# Patient Record
Sex: MALE | Race: BLACK OR AFRICAN AMERICAN | NOT HISPANIC OR LATINO
[De-identification: names, ages, dates, MRNs, and addresses within clinical notes are randomized per-mention and may not be internally consistent; named-entity substitution may affect disease eponyms.]

---

## 2017-01-18 PROBLEM — Z00.00 ENCOUNTER FOR PREVENTIVE HEALTH EXAMINATION: Status: ACTIVE | Noted: 2017-01-18

## 2017-01-20 ENCOUNTER — APPOINTMENT (OUTPATIENT)
Dept: ORTHOPEDIC SURGERY | Facility: CLINIC | Age: 37
End: 2017-01-20

## 2017-01-20 VITALS — BODY MASS INDEX: 22.75 KG/M2 | WEIGHT: 168 LBS | HEIGHT: 72 IN

## 2017-01-20 DIAGNOSIS — M25.512 PAIN IN LEFT SHOULDER: ICD-10-CM

## 2017-01-20 DIAGNOSIS — M19.90 UNSPECIFIED OSTEOARTHRITIS, UNSPECIFIED SITE: ICD-10-CM

## 2017-01-20 DIAGNOSIS — G89.29 PAIN IN LEFT SHOULDER: ICD-10-CM

## 2017-01-20 DIAGNOSIS — F17.200 NICOTINE DEPENDENCE, UNSPECIFIED, UNCOMPLICATED: ICD-10-CM

## 2017-01-24 ENCOUNTER — APPOINTMENT (OUTPATIENT)
Dept: MRI IMAGING | Facility: CLINIC | Age: 37
End: 2017-01-24

## 2017-01-24 ENCOUNTER — OUTPATIENT (OUTPATIENT)
Dept: OUTPATIENT SERVICES | Facility: HOSPITAL | Age: 37
LOS: 1 days | End: 2017-01-24
Payer: MEDICAID

## 2017-01-24 DIAGNOSIS — M25.512 PAIN IN LEFT SHOULDER: ICD-10-CM

## 2017-01-24 PROCEDURE — 73221 MRI JOINT UPR EXTREM W/O DYE: CPT

## 2017-02-02 ENCOUNTER — APPOINTMENT (OUTPATIENT)
Dept: ORTHOPEDIC SURGERY | Facility: CLINIC | Age: 37
End: 2017-02-02

## 2017-02-02 DIAGNOSIS — S43.432A SUPERIOR GLENOID LABRUM LESION OF LEFT SHOULDER, INITIAL ENCOUNTER: ICD-10-CM

## 2017-10-16 ENCOUNTER — APPOINTMENT (OUTPATIENT)
Dept: ORTHOPEDIC SURGERY | Facility: HOSPITAL | Age: 37
End: 2017-10-16

## 2017-11-01 ENCOUNTER — APPOINTMENT (OUTPATIENT)
Dept: ORTHOPEDIC SURGERY | Facility: CLINIC | Age: 37
End: 2017-11-01

## 2019-06-26 ENCOUNTER — APPOINTMENT (OUTPATIENT)
Dept: UROLOGY | Facility: CLINIC | Age: 39
End: 2019-06-26
Payer: MEDICAID

## 2019-06-26 VITALS
WEIGHT: 180 LBS | TEMPERATURE: 98.4 F | HEART RATE: 77 BPM | HEIGHT: 72 IN | BODY MASS INDEX: 24.38 KG/M2 | RESPIRATION RATE: 13 BRPM | SYSTOLIC BLOOD PRESSURE: 120 MMHG | DIASTOLIC BLOOD PRESSURE: 80 MMHG | OXYGEN SATURATION: 98 %

## 2019-06-26 DIAGNOSIS — N50.3 CYST OF EPIDIDYMIS: ICD-10-CM

## 2019-06-26 PROCEDURE — 99204 OFFICE O/P NEW MOD 45 MIN: CPT

## 2019-06-26 NOTE — ASSESSMENT
[FreeTextEntry1] : patient with firm mass felt sup to left testes\par exam signif for ? epididymal cyst\par will get sono for reassurance\par gilson lnotify of results \par labs from PCP to be faxed over for review\par patient understands above diagnosis and evaluation and all questions answered.\par

## 2019-06-26 NOTE — PHYSICAL EXAM
[General Appearance - Well Developed] : well developed [Normal Appearance] : normal appearance [General Appearance - Well Nourished] : well nourished [Well Groomed] : well groomed [General Appearance - In No Acute Distress] : no acute distress [Edema] : no peripheral edema [Abdomen Soft] : soft [Respiration, Rhythm And Depth] : normal respiratory rhythm and effort [Exaggerated Use Of Accessory Muscles For Inspiration] : no accessory muscle use [Abdomen Tenderness] : non-tender [] : no hepato-splenomegaly [Abdomen Hernia] : no hernia was discovered [Abdomen Mass (___ Cm)] : no abdominal mass palpated [Costovertebral Angle Tenderness] : no ~M costovertebral angle tenderness [Urethral Meatus] : meatus normal [Penis Abnormality] : normal circumcised penis [Scrotum] : the scrotum was normal [Epididymis] : the epididymides were normal [Testes Tenderness] : no tenderness of the testes [Testes Mass (___cm)] : there were no testicular masses [FreeTextEntry1] : small firm 5mm lesion at head of left epidiymis. ? smaller soft lesion at tail and right head , not tender [No Focal Deficits] : no focal deficits [Normal Station and Gait] : the gait and station were normal for the patient's age [Oriented To Time, Place, And Person] : oriented to person, place, and time [Affect] : the affect was normal [Not Anxious] : not anxious [Mood] : the mood was normal [Cervical Lymph Nodes Enlarged Anterior Bilaterally] : anterior cervical [Supraclavicular Lymph Nodes Enlarged Bilaterally] : supraclavicular [Cervical Lymph Nodes Enlarged Posterior Bilaterally] : posterior cervical

## 2019-06-26 NOTE — REASON FOR VISIT
Sheryle Coupe is a 60year old female. HPI:   Patient is in for follow-up on generalized anxiety disorder. Was originally on Lexapro experience too much fatigue with switch to Zoloft and did much better.   Initially was Zoloft experienced a headache whic started about a year ago intermittently got a prescription for topical steroid eyedrop which she uses externally on the skin and not on the eye. States that it is only about 1 time every few months that she needs to use it.   Patient states that she had se deficits  Psych see above  EXAM:   BP 90/64 (BP Location: Left arm, Patient Position: Sitting, Cuff Size: adult)   Pulse 80   Ht 64\"   Wt 114 lb   BMI 19.57 kg/m²   GENERAL: well developed, well nourished,in no apparent distress  SKIN: no rashes,no suspic management  As above    3. Hx of blepharitis  Handout given on blepharitis patient is to use Vaseline and wash with baby shampoo when it does bother her. - tobramycin-dexamethasone 0.3-0.1 % Ophthalmic Ointment;  Apply 1 Application to eye 2 (two) times da [Initial Visit ___] : [unfilled] is here today for an initial visit  for [unfilled]

## 2019-06-26 NOTE — REVIEW OF SYSTEMS
[Fever] : fever [Chills] : chills [Dry Eyes] : dryness of the eyes [Sore Throat] : sore throat [Diarrhea] : diarrhea [Abdominal Pain] : abdominal pain [Sexually Transmitted Disease (STD)] : sexually transmitted disease [Hot Flashes] : hot flashes [Swollen Glands] : swollen glands [Negative] : Psychiatric [see HPI] : see HPI

## 2019-06-26 NOTE — HISTORY OF PRESENT ILLNESS
[FreeTextEntry1] : patien twith hx of mass in left testes found during shower, not painful and not associated with dysuria or LUTS or erythemaa. has not grwon . seen by PCP and labs and urine dne. referral given to see  .\par denies any fam hx of pca or testes can, denies any trauma and reports hx of chlam many years ago - treated. now sex acitve with one partner - no protecton

## 2019-08-21 ENCOUNTER — APPOINTMENT (OUTPATIENT)
Dept: HEPATOLOGY | Facility: CLINIC | Age: 39
End: 2019-08-21
Payer: MEDICAID

## 2019-08-21 VITALS
TEMPERATURE: 97.5 F | HEIGHT: 72 IN | DIASTOLIC BLOOD PRESSURE: 77 MMHG | BODY MASS INDEX: 23.57 KG/M2 | SYSTOLIC BLOOD PRESSURE: 124 MMHG | WEIGHT: 174 LBS | RESPIRATION RATE: 13 BRPM | HEART RATE: 69 BPM

## 2019-08-21 DIAGNOSIS — Z78.9 OTHER SPECIFIED HEALTH STATUS: ICD-10-CM

## 2019-08-21 DIAGNOSIS — Z71.6 TOBACCO ABUSE COUNSELING: ICD-10-CM

## 2019-08-21 DIAGNOSIS — R73.03 PREDIABETES.: ICD-10-CM

## 2019-08-21 DIAGNOSIS — L81.8 OTHER SPECIFIED DISORDERS OF PIGMENTATION: ICD-10-CM

## 2019-08-21 PROCEDURE — 99204 OFFICE O/P NEW MOD 45 MIN: CPT

## 2019-08-21 RX ORDER — CLOTRIMAZOLE AND BETAMETHASONE DIPROPIONATE 10; .5 MG/G; MG/G
1-0.05 CREAM TOPICAL
Qty: 15 | Refills: 0 | Status: DISCONTINUED | COMMUNITY
Start: 2017-01-17 | End: 2019-08-21

## 2019-08-21 RX ORDER — MELOXICAM 15 MG/1
15 TABLET ORAL
Qty: 60 | Refills: 2 | Status: DISCONTINUED | COMMUNITY
Start: 2017-02-02 | End: 2019-08-21

## 2019-08-21 NOTE — CONSULT LETTER
[Dear  ___] : Dear  [unfilled], [( Thank you for referring [unfilled] for consultation for _____ )] : Thank you for referring [unfilled] for consultation for [unfilled] [Please see my note below.] : Please see my note below. [Consult Closing:] : Thank you very much for allowing me to participate in the care of this patient.  If you have any questions, please do not hesitate to contact me. [FreeTextEntry2] : Dr. Josy Ga [FreeTextEntry3] : Sincerely,\par \par Robert Rosales M.D., Ph.D.\par [facility]\par [address]\[place] [address]\par Tel: [phone]\par Fax: [phone]\par Cell: [phone]\par E-mail: [email or web address]\par

## 2019-08-21 NOTE — ASSESSMENT
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 His liver synthetic function is normal and is platelet count is normal, making advanced liver disease less likely.\par \par I have ordered a laboratory work-up to rule out other possible causes of his abnormal liver enzymes, including chronic HBV or HCV infection given his prior history of incarceration. I have ordered an abdominal sonogram and FibroScan for further evaluation.\par \par We discussed the need for alcohol moderation to decrease his risk of progressive liver disease and other alcohol-associated health risks. We discussed that a safe threshold for drinking for men is <=2 standard drinks/day and <=14 standard drinks/week, without binge drinking. I provided him with [employer] literature for a self-guided mobile tanya-based program to help him with alcohol moderation. He was appreciative of the information provided.\par \par We discussed the health risks of cigarette smoking and the benefits of smoking cessation, and he is interested in smoking cessation. We discussed available smoking cessation aids, including nicotine patches, gums, and lollipops, as well as prescription medications. We also discussed the option of transitioning to e-cigarette use temporarily to try to quit smoking altogether. He appears to be most interested in nicotine patches currently. He was provided the phone number for the [facility] so that he can find out about all available programs and try to get prescription for nicotine patches if desired. He was appreciative of the information provided.\par \par He will return for follow-up in 2 months.

## 2019-08-21 NOTE — HISTORY OF PRESENT ILLNESS
[Tattoo] : tattoo(s) [Incarceration] : incarceration [Alcohol Abuse] : alcohol abuse [Needlestick Exposure] : no needlestick exposure [Infected Sexual Partner] : no infected sexual partner [IV Drug Use] : no IV drug use [Hemodialysis] : no hemodialysis [Body Piercing] : no body piercing [Transfusion before 1992] : no transfusion before 1992 [Transplant before 1992] : no transplant before 1992 [Autoimmune Disorder] : no autoimmune disorder [Household Contact to HBV] : no household contact to HBV [Travel to Endemic Area] : no travel to an endemic area [Occupational Exposure] : no occupational exposure [Cocaine Use] : no cocaine use [de-identified] : Mr. Keenan is a 37 yo AAM with pre-diabetes (HbA1c 5.7%) and a history of heavy alcohol consumption, who was referred by his PCP Dr. Josy Ga for further evaluation of elevated liver enzymes seen on routine labs done on 6/22/19.\par \par He denies ever previously being told he had liver problems, including based on prior annual labs (not available for review), and he denies any history of jaundice, scleral icterus, overt GI bleeding, confusion, abdominal distension, or lower extremity swelling.\par \par He admits that he has been drinking alcohol more heavily recently. He used to drink (sometimes binge drinking) alcohol only on weekends, but for the past 6-8 months after being incarcerated, he drinks tequila nightly, up to a half of a 750 mL bottle nightly, as well as the occasional wine cooler. He says he no longer drinks beer because he "lost the taste for it." He does not drink wine. His tolerance has been increasing, but he denies any history of withdrawal symptoms or morning drinking. He denies any history of DWIs or other legal consequences related to alcohol, and denies any history of family, relationship, or work problems related to alcohol. He has tried to cut down on his drinking somewhat since finding out about his elevated liver enzymes, and last drank tequila on Saturday night (8/17/19).\par \par He is single. He has a 13-year-old daughter who lives with her mother, but he is involved in her care. He is not currently working or in school. He smokes 1 ppd cigarettes but is interested in trying to quit. He smokes marijuana daily. He has tried MDMA in the past but denies ever snorting or injecting any drugs.\par \par He has no known FH of any liver problems or diabetes. His mother (alive, age 56) had breast cancer in the past. His father (alive, age 58) is healthy. He has two siblings (ages 37 and 34). \par \par Outside labs (from 6/22/19):\par BMP unremarkable, with Cr 1.13\par Liver panel: AP 86, TP 7.3, Alb 4.7, AST 97, , TB 0.6\par CBC unremarkable, with Plt 281\par TSH 0.72\par HbA1c 5.7%\par Lipids: Chol 229, , Tg 86,

## 2019-08-22 LAB
AFP-TM SERPL-MCNC: 3.9 NG/ML
CERULOPLASMIN SERPL-MCNC: 28 MG/DL
FERRITIN SERPL-MCNC: 151 NG/ML
HAV IGM SER QL: NONREACTIVE
HBV CORE IGG+IGM SER QL: NONREACTIVE
HBV SURFACE AB SERPL IA-ACNC: 39.4 MIU/ML
HBV SURFACE AG SER QL: NONREACTIVE
HCV AB SER QL: NONREACTIVE
HCV S/CO RATIO: 0.12 S/CO
HEPATITIS A IGG ANTIBODY: NONREACTIVE
HIV1+2 AB SPEC QL IA.RAPID: NONREACTIVE
IGG SER QL IEP: 1011 MG/DL
IRON SATN MFR SERPL: 38 %
IRON SERPL-MCNC: 112 UG/DL
SMOOTH MUSCLE AB SER QL IF: NORMAL
TIBC SERPL-MCNC: 296 UG/DL
UIBC SERPL-MCNC: 184 UG/DL

## 2019-08-23 LAB — ANA SER IF-ACNC: NEGATIVE

## 2019-08-28 LAB
A1AT PHENOTYP SERPL-IMP: NORMAL BANDS
A1AT SERPL-MCNC: 131 MG/DL

## 2019-08-29 ENCOUNTER — APPOINTMENT (OUTPATIENT)
Dept: HEPATOLOGY | Facility: CLINIC | Age: 39
End: 2019-08-29
Payer: MEDICAID

## 2019-08-29 LAB
LYSOSOMAL ACID LIPASE INTERPRETATION: NORMAL
LYSOSOMAL ACID LIPASE: 136 CD:384473389

## 2019-08-29 PROCEDURE — 91200 LIVER ELASTOGRAPHY: CPT

## 2019-10-02 ENCOUNTER — APPOINTMENT (OUTPATIENT)
Dept: ULTRASOUND IMAGING | Facility: CLINIC | Age: 39
End: 2019-10-02

## 2019-10-25 ENCOUNTER — APPOINTMENT (OUTPATIENT)
Dept: HEPATOLOGY | Facility: CLINIC | Age: 39
End: 2019-10-25
Payer: MEDICAID

## 2019-10-25 VITALS
TEMPERATURE: 97.5 F | RESPIRATION RATE: 13 BRPM | SYSTOLIC BLOOD PRESSURE: 120 MMHG | HEIGHT: 72 IN | DIASTOLIC BLOOD PRESSURE: 74 MMHG | HEART RATE: 75 BPM | BODY MASS INDEX: 23.57 KG/M2 | WEIGHT: 174 LBS

## 2019-10-25 DIAGNOSIS — Z72.89 OTHER PROBLEMS RELATED TO LIFESTYLE: ICD-10-CM

## 2019-10-25 PROCEDURE — 99213 OFFICE O/P EST LOW 20 MIN: CPT | Mod: 25

## 2019-10-25 PROCEDURE — 90632 HEPA VACCINE ADULT IM: CPT

## 2019-10-25 PROCEDURE — 90471 IMMUNIZATION ADMIN: CPT

## 2019-10-25 PROCEDURE — ZZZZZ: CPT

## 2019-10-25 NOTE — ASSESSMENT
[FreeTextEntry1] : Assessment and Plan: Mr. Keenan is a 38 year old male with history of pre-diabetes (HbA1c 5.7%), heavy alcohol consumption, and elevated liver enzymes who presents to the hepatology clinic for follow up.\par \par 1. Abnormal Liver Enzymes with history of alcohol use \par His serologic workup was unremarkable (negative AMA, SMab, FORD). He had a fibroscan 08/29/19 with a median liver stiffness of 5.0 kPa which is consistent with F0-F1 disease,  dB/m (S 0). \par \par Will repeat labs today. Reminded patient to complete ultrasound of abdomen (previously ordered), reordered today.  He reports he only drinks on some weekends now and when he does, he has about 6-8 shots per night. Discussed the need for alcohol moderation to decrease his risk of progressive liver disease and other alcohol-associated health risks. We discussed that a safe threshold for drinking for men is <=2 standard drinks/day and <=14 standard drinks/week, without binge drinking. Patient states he is motivated and committed to moderate alcohol consumption as he is concerned about his health. He feels that he is able to do it on his own and does want to look into an alcohol program at this time. He stopped smoking 2 weeks ago and reports he has no desire to smoke at this time.\par \par 2. Health Maintenance \par Immunizations: Susceptible to HAV -undergoing vaccination, Immune to HBV\par \par Follow Up: 6 months \par \par Lyssa Skelton\par Nurse Practitioner \par Hepatology\par [facility] \par [address]\[place] [address]\par Tel: [phone]\par

## 2019-10-25 NOTE — HISTORY OF PRESENT ILLNESS
[Tattoo] : tattoo(s) [Alcohol Abuse] : alcohol abuse [Incarceration] : incarceration [Needlestick Exposure] : no needlestick exposure [Infected Sexual Partner] : no infected sexual partner [Body Piercing] : no body piercing [IV Drug Use] : no IV drug use [Hemodialysis] : no hemodialysis [Transfusion before 1992] : no transfusion before 1992 [Transplant before 1992] : no transplant before 1992 [Autoimmune Disorder] : no autoimmune disorder [Occupational Exposure] : no occupational exposure [Travel to Endemic Area] : no travel to an endemic area [Household Contact to HBV] : no household contact to HBV [Cocaine Use] : no cocaine use [de-identified] : Mr. Keenan is a 38 year old male with history of pre-diabetes (HbA1c 5.7%), heavy alcohol consumption, and elevated liver enzymes who presents to the hepatology clinic for follow up. Hepatic panel from 6/22/19 notable for AP 86, TP 7.3, Alb 4.7, AST 97, , TB 0.6. He denies ever previously being told he had liver problems, including based on prior annual labs (not available for review), and he denies any history of jaundice, scleral icterus, overt GI bleeding, confusion, abdominal distension, or lower extremity swelling. His serologic workup was unremarkable (negative AMA, SMab, FORD). He had a fibroscan 08/29/19 with a median liver stiffness of 5.0 kPa which is consistent with F0-F1 disease,  dB/m (S 0). \par \par He admits that he has been drinking alcohol more heavily before June 2019. He was he drinking tequila nightly, up to a half of a 750 mL bottle nightly, as well as the occasional wine cooler. He denies any history of DWIs or other legal consequences related to alcohol, and denies any history of family, relationship, or work problems related to alcohol. He has no known FH of any liver problems or diabetes. \par \par Since his last visit, patient reports he cut down on his alcohol intake. He reports if he does have alcohol, it is just on weekends and usually 6-8 shots per night. He also reports he does not drink every weekend night and also stopped smoking 2 weeks ago. He denies any fever, chills, anorexia, weight change, abdominal pain, jaundice, pruritus or fatigue.\par

## 2019-10-25 NOTE — PHYSICAL EXAM
[Spider Angioma] : No spider angioma(s) were observed [Abdominal  Ascites] : no ascites [Scleral Icterus] : No Scleral Icterus [Ascites Fluid Wave] : no ascites fluid wave [Asterixis] : no asterixis observed [Jaundice] : No jaundice [Depression] : no depression [Hallucinations] : ~T no ~M hallucinations [Suicidal Ideation] : no suicidal ideation [Delusions] : no ~T delusions [Homicidal Ideation] : no homicidal ideations [General Appearance - Alert] : alert [Preoccupiation With Violent Thoughts] : no preoccupation with violent thoughts [Heart Rate And Rhythm] : heart rate was normal and rhythm regular [Respiration, Rhythm And Depth] : normal respiratory rhythm and effort [Edema] : there was no peripheral edema [Oriented To Time, Place, And Person] : oriented to person, place, and time [Skin Color & Pigmentation] : normal skin color and pigmentation [Abnormal Walk] : normal gait

## 2019-10-25 NOTE — CONSULT LETTER
[Dear  ___] : Dear  [unfilled], [( Thank you for referring [unfilled] for consultation for _____ )] : Thank you for referring [unfilled] for consultation for [unfilled] [Please see my note below.] : Please see my note below. [Consult Closing:] : Thank you very much for allowing me to participate in the care of this patient.  If you have any questions, please do not hesitate to contact me. [FreeTextEntry2] : Dr. Josy Ga [FreeTextEntry3] : Sincerely,\par \par Robert Rosales M.D., Ph.D.\par [facility]\par [address]\[place] [address]\par Tel: [phone]\par Fax: [phone]\par Cell: [phone]\par E-mail: [email or web address]\par

## 2019-10-28 DIAGNOSIS — R74.8 ABNORMAL LEVELS OF OTHER SERUM ENZYMES: ICD-10-CM

## 2019-10-28 LAB
ALBUMIN SERPL ELPH-MCNC: 4.8 G/DL
ALP BLD-CCNC: 65 U/L
ALT SERPL-CCNC: 40 U/L
AST SERPL-CCNC: 31 U/L
BILIRUB DIRECT SERPL-MCNC: 0.1 MG/DL
BILIRUB INDIRECT SERPL-MCNC: 0.2 MG/DL
BILIRUB SERPL-MCNC: 0.2 MG/DL
PROT SERPL-MCNC: 7 G/DL

## 2020-05-01 ENCOUNTER — APPOINTMENT (OUTPATIENT)
Dept: HEPATOLOGY | Facility: CLINIC | Age: 40
End: 2020-05-01

## 2023-12-20 ENCOUNTER — OFFICE (OUTPATIENT)
Dept: URBAN - METROPOLITAN AREA CLINIC 63 | Facility: CLINIC | Age: 43
Setting detail: OPHTHALMOLOGY
End: 2023-12-20
Payer: COMMERCIAL

## 2023-12-20 DIAGNOSIS — H40.013: ICD-10-CM

## 2023-12-20 PROCEDURE — 92002 INTRM OPH EXAM NEW PATIENT: CPT | Performed by: STUDENT IN AN ORGANIZED HEALTH CARE EDUCATION/TRAINING PROGRAM

## 2023-12-20 PROCEDURE — 92133 CPTRZD OPH DX IMG PST SGM ON: CPT | Performed by: STUDENT IN AN ORGANIZED HEALTH CARE EDUCATION/TRAINING PROGRAM

## 2023-12-20 ASSESSMENT — CONFRONTATIONAL VISUAL FIELD TEST (CVF)
OD_FINDINGS: FULL
OS_FINDINGS: FULL

## 2023-12-21 PROBLEM — H40.013 GLAUCOMA SUSPECT, LOW RISK; BOTH EYES: Status: ACTIVE | Noted: 2023-12-20

## 2023-12-21 ASSESSMENT — SPHEQUIV_DERIVED
OD_SPHEQUIV: -0.375
OS_SPHEQUIV: -0.375

## 2023-12-21 ASSESSMENT — REFRACTION_AUTOREFRACTION
OS_AXIS: 088
OS_SPHERE: -0.25
OD_SPHERE: -0.25
OD_CYLINDER: -0.25
OS_CYLINDER: -0.25
OD_AXIS: 075

## 2024-02-24 ENCOUNTER — OFFICE (OUTPATIENT)
Dept: URBAN - METROPOLITAN AREA CLINIC 63 | Facility: CLINIC | Age: 44
Setting detail: OPHTHALMOLOGY
End: 2024-02-24
Payer: COMMERCIAL

## 2024-02-24 DIAGNOSIS — H40.013: ICD-10-CM

## 2024-02-24 PROCEDURE — 92012 INTRM OPH EXAM EST PATIENT: CPT | Performed by: STUDENT IN AN ORGANIZED HEALTH CARE EDUCATION/TRAINING PROGRAM

## 2024-02-24 PROCEDURE — 92083 EXTENDED VISUAL FIELD XM: CPT | Performed by: STUDENT IN AN ORGANIZED HEALTH CARE EDUCATION/TRAINING PROGRAM

## 2024-02-24 ASSESSMENT — REFRACTION_AUTOREFRACTION
OS_SPHERE: 0.00
OD_SPHERE: 0.00
OS_CYLINDER: -0.50
OD_CYLINDER: -0.25
OD_AXIS: 072
OS_AXIS: 091

## 2024-02-24 ASSESSMENT — SPHEQUIV_DERIVED
OS_SPHEQUIV: -0.25
OD_SPHEQUIV: -0.125

## 2024-02-24 ASSESSMENT — CONFRONTATIONAL VISUAL FIELD TEST (CVF)
OS_FINDINGS: FULL
OD_FINDINGS: FULL

## 2024-08-24 ENCOUNTER — OFFICE (OUTPATIENT)
Dept: URBAN - METROPOLITAN AREA CLINIC 63 | Facility: CLINIC | Age: 44
Setting detail: OPHTHALMOLOGY
End: 2024-08-24
Payer: COMMERCIAL

## 2024-08-24 DIAGNOSIS — H40.033: ICD-10-CM

## 2024-08-24 DIAGNOSIS — H40.032: ICD-10-CM

## 2024-08-24 DIAGNOSIS — H40.031: ICD-10-CM

## 2024-08-24 DIAGNOSIS — H40.013: ICD-10-CM

## 2024-08-24 PROCEDURE — 92133 CPTRZD OPH DX IMG PST SGM ON: CPT | Performed by: STUDENT IN AN ORGANIZED HEALTH CARE EDUCATION/TRAINING PROGRAM

## 2024-08-24 PROCEDURE — 92014 COMPRE OPH EXAM EST PT 1/>: CPT | Performed by: STUDENT IN AN ORGANIZED HEALTH CARE EDUCATION/TRAINING PROGRAM

## 2024-08-24 ASSESSMENT — CONFRONTATIONAL VISUAL FIELD TEST (CVF)
OD_FINDINGS: FULL
OS_FINDINGS: FULL

## 2024-12-07 ENCOUNTER — OFFICE (OUTPATIENT)
Dept: URBAN - METROPOLITAN AREA CLINIC 63 | Facility: CLINIC | Age: 44
Setting detail: OPHTHALMOLOGY
End: 2024-12-07
Payer: COMMERCIAL

## 2024-12-07 DIAGNOSIS — H40.033: ICD-10-CM

## 2024-12-07 PROCEDURE — 99213 OFFICE O/P EST LOW 20 MIN: CPT | Performed by: INTERNAL MEDICINE

## 2024-12-07 PROCEDURE — 92020 GONIOSCOPY: CPT | Performed by: INTERNAL MEDICINE

## 2024-12-07 PROCEDURE — 92250 FUNDUS PHOTOGRAPHY W/I&R: CPT | Performed by: INTERNAL MEDICINE

## 2024-12-07 ASSESSMENT — KERATOMETRY
OD_AXISANGLE_DEGREES: 087
OD_K2POWER_DIOPTERS: 41.00
OD_K1POWER_DIOPTERS: 40.50
OS_K2POWER_DIOPTERS: 41.50
OS_K1POWER_DIOPTERS: 41.50
OS_AXISANGLE_DEGREES: 090

## 2024-12-07 ASSESSMENT — REFRACTION_AUTOREFRACTION
OD_CYLINDER: -0.25
OS_CYLINDER: -0.25
OS_AXIS: 086
OD_AXIS: 079
OD_SPHERE: +0.25
OS_SPHERE: +0.25

## 2024-12-07 ASSESSMENT — TONOMETRY
OD_IOP_MMHG: 19
OD_IOP_MMHG: 16
OS_IOP_MMHG: 18
OS_IOP_MMHG: 16

## 2024-12-07 ASSESSMENT — CONFRONTATIONAL VISUAL FIELD TEST (CVF)
OS_FINDINGS: FULL
OD_FINDINGS: FULL

## 2024-12-07 ASSESSMENT — VISUAL ACUITY
OD_BCVA: 20/20
OS_BCVA: 20/20

## 2025-06-07 ENCOUNTER — OFFICE (OUTPATIENT)
Dept: URBAN - METROPOLITAN AREA CLINIC 63 | Facility: CLINIC | Age: 45
Setting detail: OPHTHALMOLOGY
End: 2025-06-07
Payer: COMMERCIAL

## 2025-06-07 DIAGNOSIS — H40.033: ICD-10-CM

## 2025-06-07 PROCEDURE — 92020 GONIOSCOPY: CPT | Performed by: INTERNAL MEDICINE

## 2025-06-07 PROCEDURE — 92014 COMPRE OPH EXAM EST PT 1/>: CPT | Performed by: INTERNAL MEDICINE

## 2025-06-07 PROCEDURE — 92083 EXTENDED VISUAL FIELD XM: CPT | Performed by: INTERNAL MEDICINE

## 2025-06-07 ASSESSMENT — REFRACTION_AUTOREFRACTION
OD_AXIS: 071
OS_AXIS: 070
OD_CYLINDER: -0.50
OD_SPHERE: +0.25
OS_CYLINDER: -0.25
OS_SPHERE: 0.00

## 2025-06-07 ASSESSMENT — VISUAL ACUITY
OD_BCVA: 20/20
OS_BCVA: 20/20

## 2025-06-07 ASSESSMENT — CONFRONTATIONAL VISUAL FIELD TEST (CVF)
OS_FINDINGS: FULL
OD_FINDINGS: FULL

## 2025-06-07 ASSESSMENT — KERATOMETRY
OS_AXISANGLE_DEGREES: 087
OS_K1POWER_DIOPTERS: 41.25
OD_K1POWER_DIOPTERS: 40.75
OD_K2POWER_DIOPTERS: 41.25
OS_K2POWER_DIOPTERS: 41.75
OD_AXISANGLE_DEGREES: 091

## 2025-06-07 ASSESSMENT — TONOMETRY
OD_IOP_MMHG: 18
OS_IOP_MMHG: 18